# Patient Record
Sex: FEMALE | Race: WHITE | NOT HISPANIC OR LATINO | Employment: UNEMPLOYED | URBAN - METROPOLITAN AREA
[De-identification: names, ages, dates, MRNs, and addresses within clinical notes are randomized per-mention and may not be internally consistent; named-entity substitution may affect disease eponyms.]

---

## 2019-09-10 PROCEDURE — 93005 ELECTROCARDIOGRAM TRACING: CPT

## 2019-09-10 PROCEDURE — 99283 EMERGENCY DEPT VISIT LOW MDM: CPT | Performed by: EMERGENCY MEDICINE

## 2019-09-10 RX ORDER — HYDROCHLOROTHIAZIDE 25 MG/1
25 TABLET ORAL DAILY
COMMUNITY

## 2019-09-10 RX ORDER — SERTRALINE HYDROCHLORIDE 50 MG/1
50 TABLET, FILM COATED ORAL DAILY
COMMUNITY

## 2019-09-10 RX ORDER — AMLODIPINE BESYLATE 5 MG/1
5 TABLET ORAL DAILY
COMMUNITY

## 2019-09-10 RX ORDER — DICYCLOMINE HYDROCHLORIDE 10 MG/1
10 CAPSULE ORAL
COMMUNITY

## 2019-09-10 RX ORDER — EZETIMIBE 10 MG/1
10 TABLET ORAL DAILY
COMMUNITY

## 2019-09-10 RX ORDER — FAMOTIDINE 20 MG/1
40 TABLET, FILM COATED ORAL 2 TIMES DAILY
COMMUNITY

## 2019-09-10 ASSESSMENT — PAIN DESCRIPTION - DESCRIPTORS: DESCRIPTORS: PRESSURE

## 2019-09-11 ENCOUNTER — APPOINTMENT (OUTPATIENT)
Dept: RADIOLOGY | Facility: HOSPITAL | Age: 69
End: 2019-09-11
Payer: MEDICARE

## 2019-09-11 ENCOUNTER — HOSPITAL ENCOUNTER (EMERGENCY)
Facility: HOSPITAL | Age: 69
Discharge: HOME | End: 2019-09-11
Attending: EMERGENCY MEDICINE
Payer: MEDICARE

## 2019-09-11 ENCOUNTER — APPOINTMENT (OUTPATIENT)
Dept: CT IMAGING | Facility: HOSPITAL | Age: 69
End: 2019-09-11
Payer: MEDICARE

## 2019-09-11 VITALS
SYSTOLIC BLOOD PRESSURE: 157 MMHG | DIASTOLIC BLOOD PRESSURE: 77 MMHG | TEMPERATURE: 97.7 F | WEIGHT: 181.66 LBS | RESPIRATION RATE: 14 BRPM | OXYGEN SATURATION: 97 % | HEART RATE: 72 BPM

## 2019-09-11 DIAGNOSIS — R79.9 ELEVATED BUN: ICD-10-CM

## 2019-09-11 DIAGNOSIS — K80.20 CHOLELITHIASIS: ICD-10-CM

## 2019-09-11 DIAGNOSIS — R73.9 HYPERGLYCEMIA: ICD-10-CM

## 2019-09-11 DIAGNOSIS — R10.13 EPIGASTRIC PAIN: Primary | ICD-10-CM

## 2019-09-11 DIAGNOSIS — E66.9 OBESITY: ICD-10-CM

## 2019-09-11 DIAGNOSIS — E87.6 HYPOKALEMIA: ICD-10-CM

## 2019-09-11 LAB
ALBUMIN SERPL-MCNC: 4.2 G/DL (ref 3.5–5.3)
ALP SERPL-CCNC: 33 U/L (ref 55–142)
ALT SERPL-CCNC: 29 U/L (ref 0–52)
ANION GAP SERPL CALC-SCNC: 10 MMOL/L (ref 3–11)
AST SERPL-CCNC: 24 U/L (ref 0–39)
BASOPHILS # BLD AUTO: 0 10*3/UL
BASOPHILS NFR BLD AUTO: 1 % (ref 0–2)
BILIRUB SERPL-MCNC: 0.37 MG/DL (ref 0–1.4)
BNP SERPL-MCNC: 83 PG/ML (ref 0–100)
BUN SERPL-MCNC: 27 MG/DL (ref 7–25)
CALCIUM ALBUM COR SERPL-MCNC: 9.8 MG/DL (ref 8.6–10.3)
CALCIUM SERPL-MCNC: 10 MG/DL (ref 8.6–10.3)
CHLORIDE SERPL-SCNC: 104 MMOL/L (ref 98–107)
CO2 SERPL-SCNC: 26 MMOL/L (ref 21–32)
CREAT SERPL-MCNC: 0.89 MG/DL (ref 0.6–1.2)
EOSINOPHIL # BLD AUTO: 0.1 10*3/UL
EOSINOPHIL NFR BLD AUTO: 1 % (ref 0–3)
ERYTHROCYTE [DISTWIDTH] IN BLOOD BY AUTOMATED COUNT: 15.5 % (ref 11.5–14)
GFR SERPL CREATININE-BSD FRML MDRD: 65 ML/MIN/1.73M*2
GLUCOSE SERPL-MCNC: 146 MG/DL (ref 70–105)
HCT VFR BLD AUTO: 39.7 % (ref 34–45)
HGB BLD-MCNC: 12.9 G/DL (ref 11.5–15.5)
LIPASE SERPL-CCNC: 44 U/L (ref 11–82)
LYMPHOCYTES # BLD AUTO: 1.8 10*3/UL
LYMPHOCYTES NFR BLD AUTO: 23 % (ref 11–47)
MAGNESIUM SERPL-MCNC: 2 MG/DL (ref 1.8–2.4)
MCH RBC QN AUTO: 27.6 PG (ref 28–33)
MCHC RBC AUTO-ENTMCNC: 32.6 G/DL (ref 32–36)
MCV RBC AUTO: 84.6 FL (ref 81–97)
MONOCYTES # BLD AUTO: 0.2 10*3/UL
MONOCYTES NFR BLD AUTO: 3 % (ref 3–11)
NEUTROPHILS # BLD AUTO: 5.6 10*3/UL
NEUTROPHILS NFR BLD AUTO: 72 % (ref 41–81)
PLATELET # BLD AUTO: 224 10*3/UL (ref 140–350)
PMV BLD AUTO: 8.4 FL (ref 6.9–10.8)
POTASSIUM SERPL-SCNC: 3.4 MMOL/L (ref 3.5–5.1)
PROT SERPL-MCNC: 6.8 G/DL (ref 6–8.3)
RBC # BLD AUTO: 4.69 10*6/ΜL (ref 3.7–5.3)
SODIUM SERPL-SCNC: 140 MMOL/L (ref 135–145)
TROPONIN I SERPL-MCNC: <0.03 NG/ML
TSH SERPL DL<=0.05 MIU/L-ACNC: 4.33 UIU/ML (ref 0.34–4.82)
WBC # BLD AUTO: 7.8 10*3/UL (ref 4.5–10.5)

## 2019-09-11 PROCEDURE — 6360000200 HC RX 636 W HCPCS (ALT 250 FOR IP): Performed by: EMERGENCY MEDICINE

## 2019-09-11 PROCEDURE — 71046 X-RAY EXAM CHEST 2 VIEWS: CPT

## 2019-09-11 PROCEDURE — 2550000100 HC RX 255: Performed by: EMERGENCY MEDICINE

## 2019-09-11 PROCEDURE — 84443 ASSAY THYROID STIM HORMONE: CPT | Performed by: EMERGENCY MEDICINE

## 2019-09-11 PROCEDURE — 2590000100 HC RX 259: Performed by: EMERGENCY MEDICINE

## 2019-09-11 PROCEDURE — 74177 CT ABD & PELVIS W/CONTRAST: CPT

## 2019-09-11 PROCEDURE — 2500000200 HC RX 250 WO HCPCS: Performed by: EMERGENCY MEDICINE

## 2019-09-11 PROCEDURE — 36415 COLL VENOUS BLD VENIPUNCTURE: CPT | Performed by: EMERGENCY MEDICINE

## 2019-09-11 PROCEDURE — 85025 COMPLETE CBC W/AUTO DIFF WBC: CPT | Performed by: EMERGENCY MEDICINE

## 2019-09-11 PROCEDURE — 83735 ASSAY OF MAGNESIUM: CPT | Performed by: EMERGENCY MEDICINE

## 2019-09-11 PROCEDURE — 83880 ASSAY OF NATRIURETIC PEPTIDE: CPT | Performed by: EMERGENCY MEDICINE

## 2019-09-11 PROCEDURE — 96375 TX/PRO/DX INJ NEW DRUG ADDON: CPT

## 2019-09-11 PROCEDURE — 96361 HYDRATE IV INFUSION ADD-ON: CPT

## 2019-09-11 PROCEDURE — 2580000300 HC RX 258: Performed by: EMERGENCY MEDICINE

## 2019-09-11 PROCEDURE — 83690 ASSAY OF LIPASE: CPT | Performed by: EMERGENCY MEDICINE

## 2019-09-11 PROCEDURE — 80053 COMPREHEN METABOLIC PANEL: CPT | Performed by: EMERGENCY MEDICINE

## 2019-09-11 PROCEDURE — 96374 THER/PROPH/DIAG INJ IV PUSH: CPT

## 2019-09-11 PROCEDURE — 84484 ASSAY OF TROPONIN QUANT: CPT | Performed by: EMERGENCY MEDICINE

## 2019-09-11 RX ORDER — IOPAMIDOL 755 MG/ML
115 INJECTION, SOLUTION INTRAVASCULAR ONCE
Status: COMPLETED | OUTPATIENT
Start: 2019-09-11 | End: 2019-09-11

## 2019-09-11 RX ORDER — HYDROCODONE BITARTRATE AND ACETAMINOPHEN 5; 325 MG/1; MG/1
1 TABLET ORAL ONCE
Status: DISCONTINUED | OUTPATIENT
Start: 2019-09-11 | End: 2019-09-11 | Stop reason: HOSPADM

## 2019-09-11 RX ORDER — ONDANSETRON HCL 4 MG
1 TABLET ORAL ONCE
Status: DISCONTINUED | OUTPATIENT
Start: 2019-09-11 | End: 2019-09-11 | Stop reason: HOSPADM

## 2019-09-11 RX ORDER — SODIUM CHLORIDE 9 MG/ML
1000 INJECTION, SOLUTION INTRAVENOUS ONCE
Status: COMPLETED | OUTPATIENT
Start: 2019-09-11 | End: 2019-09-11

## 2019-09-11 RX ORDER — MORPHINE SULFATE 4 MG/ML
4 INJECTION, SOLUTION INTRAMUSCULAR; INTRAVENOUS ONCE
Status: COMPLETED | OUTPATIENT
Start: 2019-09-11 | End: 2019-09-11

## 2019-09-11 RX ORDER — ONDANSETRON HYDROCHLORIDE 2 MG/ML
4 INJECTION, SOLUTION INTRAVENOUS ONCE
Status: COMPLETED | OUTPATIENT
Start: 2019-09-11 | End: 2019-09-11

## 2019-09-11 RX ADMIN — MORPHINE SULFATE 4 MG: 4 INJECTION, SOLUTION INTRAMUSCULAR; INTRAVENOUS at 00:25

## 2019-09-11 RX ADMIN — SODIUM CHLORIDE 1000 ML: 9 INJECTION, SOLUTION INTRAVENOUS at 00:32

## 2019-09-11 RX ADMIN — ONDANSETRON 4 MG: 2 INJECTION INTRAMUSCULAR; INTRAVENOUS at 00:26

## 2019-09-11 RX ADMIN — LIDOCAINE HYDROCHLORIDE 45 ML: 20 SOLUTION ORAL; TOPICAL at 00:26

## 2019-09-11 RX ADMIN — IOPAMIDOL 115 ML: 755 INJECTION, SOLUTION INTRAVENOUS at 01:40

## 2019-09-11 NOTE — ED PROVIDER NOTES
"Abdominal Pain (pt states hx of acid reflux and states increased pain. pt states \"this is worse than I've ever had\")        HPI:  This is a 69-year-old female reporting to the emergency department with complaints of epigastric pain.    Patient states that her pain started several hours ago.  She describes it to be a crampy, burning type discomfort in her epigastrium.  No radiation of her pain.  She is unaware of anything that worsens or improves her pain.  She has moderate intensity.  Patient does report that she has had this discomfort numerous times in the past.    Patient is on a bus tour and she reports that she forgot her Bentyl at home.  The pain is worse than it normally is and she now presents for further evaluation.  She denies any chest pain, pressure, or heaviness.  No shortness of breath.  No weakness in her extremities.  She denies any lower abdominal pain.  No fevers or chills.  No vomiting.  No diarrhea.          Past Medical History:   Diagnosis Date   • GERD (gastroesophageal reflux disease)        History reviewed. No pertinent surgical history.    Social History     Socioeconomic History   • Marital status:      Spouse name: Not on file   • Number of children: Not on file   • Years of education: Not on file   • Highest education level: Not on file   Occupational History   • Not on file   Social Needs   • Financial resource strain: Not on file   • Food insecurity:     Worry: Not on file     Inability: Not on file   • Transportation needs:     Medical: Not on file     Non-medical: Not on file   Tobacco Use   • Smoking status: Not on file   Substance and Sexual Activity   • Alcohol use: Not on file   • Drug use: Not on file   • Sexual activity: Not on file   Lifestyle   • Physical activity:     Days per week: Not on file     Minutes per session: Not on file   • Stress: Not on file   Relationships   • Social connections:     Talks on phone: Not on file     Gets together: Not on file     Attends " Latter day service: Not on file     Active member of club or organization: Not on file     Attends meetings of clubs or organizations: Not on file     Relationship status: Not on file   • Intimate partner violence:     Fear of current or ex partner: Not on file     Emotionally abused: Not on file     Physically abused: Not on file     Forced sexual activity: Not on file   Other Topics Concern   • Not on file   Social History Narrative   • Not on file       History reviewed. No pertinent family history.    No Known Allergies      Current Outpatient Medications:   •  amLODIPine (NORVASC) 5 mg tablet, Take 5 mg by mouth daily, Disp: , Rfl:   •  sertraline (ZOLOFT) 50 mg tablet, Take 50 mg by mouth daily, Disp: , Rfl:   •  ezetimibe (Zetia) 10 mg tablet, Take 10 mg by mouth daily, Disp: , Rfl:   •  FENOFIBRATE ORAL, Take 60 mg by mouth, Disp: , Rfl:   •  famotidine (PEPCID) 20 mg tablet, Take 40 mg by mouth 2 (two) times a day, Disp: , Rfl:   •  hydroCHLOROthiazide (HYDRODIURIL) 25 mg tablet, Take 25 mg by mouth daily, Disp: , Rfl:   •  dicyclomine (BENTYL) 10 mg capsule, Take 10 mg by mouth 4 (four) times a day (before meals and nightly), Disp: , Rfl:       ROS:  Constitutional: negative for fever  Eyes: negative for visual changes  ENT: negative for sore throat  Cardiovascular: negative for chest pain  Respiratory: negative for shortness of breath   GI: negative for abdominal pain  : negative for hematuria  Musculoskeletal: negative for back pain  Neuro: negative for headache  Hematology: negative for bleeding  Immunology: negative for joint pain      ED Triage Vitals   Temp Heart Rate Resp BP SpO2   09/10/19 2116 09/10/19 2116 09/10/19 2116 09/10/19 2116 09/10/19 2116   36.5 °C (97.7 °F) 66 17 (!) 181/83 97 %      Temp Source Heart Rate Source Patient Position BP Location FiO2 (%)   09/10/19 2116 -- 09/11/19 0104 -- --   Oral  Head of bed 30 degrees or higher           Physical Exam:  Nursing note and vitals  reviewed.  Constitutional: Nontoxic-appearing elderly female.  She answers questions appropriately.  Head: Normocephalic and atraumatic. Mucous membranes are moist.   Eyes: Pupils are equal, round, and reactive to light.   Neck: Supple.  Cardiovascular: Regular rate and rhythm without murmur.   Pulmonary/Chest: No respiratory distress.  Clear to auscultation bilaterally.  Abdominal: Soft and nontender.    She locates her discomfort in the epigastrium however she is not tender.  Back: No CVA tenderness. No midline tenderness.   Musculoskeletal: No edema  Neurological: Alert.   Skin: Skin is warm and dry. No rash noted.   Psychiatric: Normal mood and affect.        Labs:  Labs Reviewed   CBC WITH AUTO DIFFERENTIAL - Abnormal       Result Value    WBC 7.8      RBC 4.69      Hemoglobin 12.9      Hematocrit 39.7      MCV 84.6      MCH 27.6 (*)     MCHC 32.6      RDW 15.5 (*)     Platelets 224      MPV 8.4      Neutrophils% 72      Lymphocytes% 23      Monocytes% 3      Eosinophils% 1      Basophils% 1      Neutrophils Absolute 5.60      Lymphocytes Absolute 1.80      Monocytes Absolute 0.20      Eosinophils Absolute 0.10      Basophils Absolute 0.00     COMPREHENSIVE METABOLIC PANEL - Abnormal    Sodium 140      Potassium 3.4 (*)     Chloride 104      CO2 26      Anion Gap 10      BUN 27 (*)     Creatinine 0.89      Glucose 146 (*)     Calcium 10.0      AST 24      ALT (SGPT) 29      Alkaline Phosphatase 33 (*)     Total Protein 6.8      Albumin 4.2      Total Bilirubin 0.37      eGFR 65      Corrected Calcium 9.8      Narrative:     Estimated GFR calculated using the 2009 CKD-EPI creatinine equation.   TROPONIN I - Normal    Troponin I <0.030     MAGNESIUM - Normal    Magnesium 2.0     TSH - Normal    TSH 4.335     B-TYPE NATRIURETIC PEPTIDE (BNP) - Normal    BNP 83     LIPASE - Normal    Lipase 44         Imaging:  CT ABDOMEN PELVIS W IV CONTRAST NO ORAL CONTRAST   Preliminary Result   Impression:      Distended  gallbladder with possible stone in the cystic duct.   Follow-up gallbladder studies may be of value.      Nonspecific left renal hypodensities, question cyst versus solid.   Recommend follow-up nonemergent ultrasound.      X-ray chest 2 views    (Results Pending)       MDM:   This is a 69-year-old female presenting to the emergency department with complaints of epigastric pain.  Patient appears nontoxic but uncomfortable.  She will be given a liter normal saline along with morphine and Zofran.  She will also be given a GI cocktail.  Baseline labs and a troponin will be ordered.  EKG will be obtained and patient will be observed.    She remained stable.  She stated that she was not significantly improved and ultimately a CT of the abdomen pelvis was obtained.  This showed a distended gallbladder with a possible stone in the cystic duct.  Admission to the hospital was recommended but the patient does not want to stay.  Long conversation was had regarding to possible complications if this was not further worked up.  Patient and her  are willing to accept these risks.  He will be discharged home.  The importance of returning to the emergency department if symptoms worsen was stressed.  He will be discharged home.    Given   Medications   ondansetron (ZOFRAN) injection 4 mg (4 mg intravenous Given 9/11/19 0026)   morphine injection 4 mg (4 mg intravenous Given 9/11/19 0025)   sodium chloride 0.9 % bolus 1,000 mL (1,000 mL intravenous New Bag/New Syringe 9/11/19 0032)   antacid/lidocaine 2% viscous (GI COCKTAIL KIT) 45 mL suspension (45 mL oral Given 9/11/19 0026)   iopamidol (ISOVUE-370) 76 % injection 115 mL (115 mL intravenous Given 9/11/19 0140)         Procedure    Procedures        Clinical Impression:    Final diagnoses:   [R10.13] Epigastric pain   [K80.20] Cholelithiasis   [E87.6] Hypokalemia   [R79.9] Elevated BUN   [R73.9] Hyperglycemia   [E66.9] Obesity         I, Koby Loaiza MD, personally performed the  services described in this documentation as transcribed by an emergency department scribe, in my presence, and it is both accurate and complete.      Koby Loaiza MD  09/11/19 2501

## 2019-09-11 NOTE — LETTER
September 13, 2019    Karyn Phelan  [address] [place] [id number]      To whom it may concern:      Mrs. Karyn Phelan was seen in the emergency department by myself for abdominal pain.  She had a significant health hazard and was no longer able to travel on her tour.  She was required to emergently terminate her trip and travel home for this health condition.    If you have any questions or concerns, please don't hesitate to call.    Sincerely,    Koby Loaiza Jr., MD         No name on file.        CC: No Recipients

## 2019-09-11 NOTE — DISCHARGE INSTRUCTIONS
Return to this emergency department if any symptoms worsen.  Follow-up with your primary care physician upon return home.

## 2022-02-21 ENCOUNTER — DOCTOR'S OFFICE (OUTPATIENT)
Dept: URBAN - METROPOLITAN AREA CLINIC 162 | Facility: CLINIC | Age: 72
Setting detail: OPHTHALMOLOGY
End: 2022-02-21
Payer: MEDICARE

## 2022-02-21 PROCEDURE — 92250 FUNDUS PHOTOGRAPHY W/I&R: CPT | Performed by: OPHTHALMOLOGY

## 2022-02-21 PROCEDURE — 92014 COMPRE OPH EXAM EST PT 1/>: CPT | Performed by: OPHTHALMOLOGY

## 2022-02-21 PROCEDURE — 92015 DETERMINE REFRACTIVE STATE: CPT | Performed by: OPHTHALMOLOGY

## 2022-02-21 ASSESSMENT — REFRACTION_CURRENTRX
OD_AXIS: 023
OD_VPRISM_DIRECTION: PROGS
OD_SPHERE: -2.50
OD_ADD: +2.00
OD_OVR_VA: 20/
OS_VPRISM_DIRECTION: PROGS
OS_CYLINDER: -1.00
OD_CYLINDER: -0.50
OS_SPHERE: -0.75
OS_AXIS: 118
OS_ADD: +2.00
OS_OVR_VA: 20/

## 2022-02-21 ASSESSMENT — PACHYMETRY
OD_CT_CORRECTION: 4
OS_CT_CORRECTION: 3
OS_CT_UM: 506
OD_CT_UM: 496

## 2022-02-21 ASSESSMENT — TEAR BREAK UP TIME (TBUT)
OD_TBUT: 5 SEC
OS_TBUT: 7 SEC

## 2022-02-21 ASSESSMENT — REFRACTION_MANIFEST
OS_ADD: +2.50
OS_CYLINDER: -1.25
OS_VA1: 20/30
OD_CYLINDER: SPH
OS_SPHERE: -0.75
OS_AXIS: 110
OD_VA1: 20/25+
OD_ADD: +2.50
OD_SPHERE: -2.25

## 2022-02-21 ASSESSMENT — SPHEQUIV_DERIVED
OS_SPHEQUIV: -1.25
OS_SPHEQUIV: -1.375
OD_SPHEQUIV: -2.375

## 2022-02-21 ASSESSMENT — KERATOMETRY
OS_K1POWER_DIOPTERS: 44.50
OD_K1POWER_DIOPTERS: 45.25
OD_K2POWER_DIOPTERS: 45.50
OS_AXISANGLE_DEGREES: 42
OD_AXISANGLE_DEGREES: 114
OS_K2POWER_DIOPTERS: 45.00

## 2022-02-21 ASSESSMENT — AXIALLENGTH_DERIVED
OS_AL: 23.6687
OS_AL: 23.6198
OD_AL: 23.8309

## 2022-02-21 ASSESSMENT — REFRACTION_AUTOREFRACTION
OD_AXIS: 31
OS_SPHERE: -0.50
OD_SPHERE: -2.25
OS_CYLINDER: -1.50
OS_AXIS: 117
OD_CYLINDER: -0.25

## 2022-02-21 ASSESSMENT — CONFRONTATIONAL VISUAL FIELD TEST (CVF)
OD_FINDINGS: FULL
OS_FINDINGS: FULL

## 2022-02-21 ASSESSMENT — VISUAL ACUITY
OD_BCVA: 20/40
OS_BCVA: 20/30

## 2022-03-09 ENCOUNTER — DOCTOR'S OFFICE (OUTPATIENT)
Dept: URBAN - METROPOLITAN AREA CLINIC 162 | Facility: CLINIC | Age: 72
Setting detail: OPHTHALMOLOGY
End: 2022-03-09
Payer: MEDICARE

## 2022-03-09 PROCEDURE — 92083 EXTENDED VISUAL FIELD XM: CPT | Performed by: OPHTHALMOLOGY

## 2022-08-22 ENCOUNTER — DOCTOR'S OFFICE (OUTPATIENT)
Dept: URBAN - METROPOLITAN AREA CLINIC 162 | Facility: CLINIC | Age: 72
Setting detail: OPHTHALMOLOGY
End: 2022-08-22
Payer: MEDICARE

## 2022-08-22 ENCOUNTER — RX ONLY (RX ONLY)
Age: 72
End: 2022-08-22

## 2022-08-22 PROBLEM — H25.13: Status: ACTIVE | Noted: 2017-02-01

## 2022-08-22 PROBLEM — H04.123 DES; BOTH EYES: Status: ACTIVE | Noted: 2020-11-13

## 2022-08-22 PROBLEM — H15.102: Status: ACTIVE | Noted: 2017-02-01

## 2022-08-22 PROBLEM — H40.013 POAG SUSP; BOTH EYES ;
LOW SUSPICION: Status: ACTIVE | Noted: 2017-02-01

## 2022-08-22 PROBLEM — H44.322: Status: ACTIVE | Noted: 2017-02-01

## 2022-08-22 PROCEDURE — 92020 GONIOSCOPY: CPT | Performed by: OPHTHALMOLOGY

## 2022-08-22 PROCEDURE — 92133 CPTRZD OPH DX IMG PST SGM ON: CPT | Performed by: OPHTHALMOLOGY

## 2022-08-22 PROCEDURE — 92012 INTRM OPH EXAM EST PATIENT: CPT | Performed by: OPHTHALMOLOGY

## 2022-08-22 ASSESSMENT — KERATOMETRY
OS_AXISANGLE_DEGREES: 42
OD_K1POWER_DIOPTERS: 45.25
OS_K1POWER_DIOPTERS: 44.50
OS_K2POWER_DIOPTERS: 45.00
OD_AXISANGLE_DEGREES: 114
OD_K2POWER_DIOPTERS: 45.50

## 2022-08-22 ASSESSMENT — REFRACTION_CURRENTRX
OD_AXIS: 023
OS_VPRISM_DIRECTION: PROGS
OD_OVR_VA: 20/
OS_ADD: +2.00
OD_CYLINDER: -0.50
OD_SPHERE: -2.50
OS_CYLINDER: -1.00
OD_ADD: +2.00
OS_SPHERE: -0.75
OD_VPRISM_DIRECTION: PROGS
OS_AXIS: 118
OS_OVR_VA: 20/

## 2022-08-22 ASSESSMENT — REFRACTION_MANIFEST
OD_CYLINDER: SPH
OS_SPHERE: -0.75
OS_CYLINDER: -1.25
OD_VA1: 20/25+
OS_VA1: 20/30
OS_AXIS: 110
OD_SPHERE: -2.25
OD_ADD: +2.50
OS_ADD: +2.50

## 2022-08-22 ASSESSMENT — REFRACTION_AUTOREFRACTION
OD_CYLINDER: -0.25
OD_AXIS: 31
OS_SPHERE: -0.50
OD_SPHERE: -2.25
OS_AXIS: 117
OS_CYLINDER: -1.50

## 2022-08-22 ASSESSMENT — AXIALLENGTH_DERIVED
OS_AL: 23.6687
OD_AL: 23.8309
OS_AL: 23.6198

## 2022-08-22 ASSESSMENT — VISUAL ACUITY
OD_BCVA: 20/40
OS_BCVA: 20/30

## 2022-08-22 ASSESSMENT — SPHEQUIV_DERIVED
OS_SPHEQUIV: -1.25
OS_SPHEQUIV: -1.375
OD_SPHEQUIV: -2.375

## 2022-08-22 ASSESSMENT — TEAR BREAK UP TIME (TBUT)
OS_TBUT: 7 SEC
OD_TBUT: 5 SEC

## 2023-03-06 ENCOUNTER — DOCTOR'S OFFICE (OUTPATIENT)
Dept: URBAN - METROPOLITAN AREA CLINIC 162 | Facility: CLINIC | Age: 73
Setting detail: OPHTHALMOLOGY
End: 2023-03-06
Payer: MEDICARE

## 2023-03-06 DIAGNOSIS — H04.123: ICD-10-CM

## 2023-03-06 DIAGNOSIS — H25.13: ICD-10-CM

## 2023-03-06 DIAGNOSIS — H40.013: ICD-10-CM

## 2023-03-06 PROCEDURE — 92250 FUNDUS PHOTOGRAPHY W/I&R: CPT | Performed by: OPHTHALMOLOGY

## 2023-03-06 PROCEDURE — 92014 COMPRE OPH EXAM EST PT 1/>: CPT | Performed by: OPHTHALMOLOGY

## 2023-03-06 ASSESSMENT — REFRACTION_MANIFEST
OD_VA1: 20/25+
OS_CYLINDER: -1.25
OD_SPHERE: -2.25
OD_ADD: +2.50
OS_ADD: +2.50
OS_SPHERE: -0.75
OS_AXIS: 110
OS_VA1: 20/30
OD_CYLINDER: SPH

## 2023-03-06 ASSESSMENT — CONFRONTATIONAL VISUAL FIELD TEST (CVF)
OS_FINDINGS: FULL
OD_FINDINGS: FULL

## 2023-03-06 ASSESSMENT — KERATOMETRY
OS_AXISANGLE_DEGREES: 041
OD_K2POWER_DIOPTERS: 5.75
OS_K1POWER_DIOPTERS: 44.25
OD_AXISANGLE_DEGREES: 111
OD_K1POWER_DIOPTERS: 45.00
OS_K2POWER_DIOPTERS: 45.00

## 2023-03-06 ASSESSMENT — AXIALLENGTH_DERIVED
OS_AL: 23.715
OD_AL: 34.66
OS_AL: 23.715

## 2023-03-06 ASSESSMENT — REFRACTION_AUTOREFRACTION
OS_CYLINDER: -1.25
OD_SPHERE: -2.00
OD_CYLINDER: -0.50
OS_SPHERE: -0.75
OD_AXIS: 057
OS_AXIS: 111

## 2023-03-06 ASSESSMENT — REFRACTION_CURRENTRX
OS_ADD: +2.00
OS_CYLINDER: -1.00
OD_SPHERE: -2.50
OD_OVR_VA: 20/
OD_VPRISM_DIRECTION: PROGS
OS_OVR_VA: 20/
OS_AXIS: 118
OS_SPHERE: -0.75
OD_ADD: +2.00
OD_AXIS: 023
OS_VPRISM_DIRECTION: PROGS
OD_CYLINDER: -0.50

## 2023-03-06 ASSESSMENT — VISUAL ACUITY
OD_BCVA: 20/30
OS_BCVA: 20/30

## 2023-03-06 ASSESSMENT — SPHEQUIV_DERIVED
OD_SPHEQUIV: -2.25
OS_SPHEQUIV: -1.375
OS_SPHEQUIV: -1.375

## 2023-03-06 ASSESSMENT — TEAR BREAK UP TIME (TBUT)
OD_TBUT: 5 SEC
OS_TBUT: 7 SEC

## 2023-07-11 ENCOUNTER — DOCTOR'S OFFICE (OUTPATIENT)
Dept: URBAN - METROPOLITAN AREA CLINIC 162 | Facility: CLINIC | Age: 73
Setting detail: OPHTHALMOLOGY
End: 2023-07-11
Payer: MEDICARE

## 2023-07-11 DIAGNOSIS — H40.013: ICD-10-CM

## 2023-07-11 PROCEDURE — 92012 INTRM OPH EXAM EST PATIENT: CPT | Performed by: OPHTHALMOLOGY

## 2023-07-11 PROCEDURE — 92083 EXTENDED VISUAL FIELD XM: CPT | Performed by: OPHTHALMOLOGY

## 2023-07-11 ASSESSMENT — REFRACTION_CURRENTRX
OD_AXIS: 023
OD_SPHERE: -2.50
OD_VPRISM_DIRECTION: PROGS
OS_CYLINDER: -1.00
OS_AXIS: 118
OD_ADD: +2.00
OS_OVR_VA: 20/
OD_OVR_VA: 20/
OS_ADD: +2.00
OD_CYLINDER: -0.50
OS_SPHERE: -0.75
OS_VPRISM_DIRECTION: PROGS

## 2023-07-11 ASSESSMENT — VISUAL ACUITY
OD_BCVA: 20/40
OS_BCVA: 20/30

## 2023-07-11 ASSESSMENT — SPHEQUIV_DERIVED
OD_SPHEQUIV: -2.25
OS_SPHEQUIV: -1.375
OS_SPHEQUIV: -1.375

## 2023-07-11 ASSESSMENT — AXIALLENGTH_DERIVED
OS_AL: 23.715
OD_AL: 34.66
OS_AL: 23.715

## 2023-07-11 ASSESSMENT — KERATOMETRY
OD_AXISANGLE_DEGREES: 111
OD_K2POWER_DIOPTERS: 5.75
OS_AXISANGLE_DEGREES: 041
OS_K1POWER_DIOPTERS: 44.25
OD_K1POWER_DIOPTERS: 45.00
OS_K2POWER_DIOPTERS: 45.00

## 2023-07-11 ASSESSMENT — REFRACTION_MANIFEST
OD_VA1: 20/25+
OS_SPHERE: -0.75
OS_CYLINDER: -1.25
OS_ADD: +2.50
OD_CYLINDER: SPH
OS_VA1: 20/30
OD_SPHERE: -2.25
OS_AXIS: 110
OD_ADD: +2.50

## 2023-07-11 ASSESSMENT — REFRACTION_AUTOREFRACTION
OD_SPHERE: -2.00
OD_CYLINDER: -0.50
OS_CYLINDER: -1.25
OD_AXIS: 057
OS_SPHERE: -0.75
OS_AXIS: 111

## 2023-07-11 ASSESSMENT — TEAR BREAK UP TIME (TBUT)
OD_TBUT: 5 SEC
OS_TBUT: 7 SEC

## 2023-11-27 ENCOUNTER — DOCTOR'S OFFICE (OUTPATIENT)
Dept: URBAN - METROPOLITAN AREA CLINIC 162 | Facility: CLINIC | Age: 73
Setting detail: OPHTHALMOLOGY
End: 2023-11-27
Payer: MEDICARE

## 2023-11-27 DIAGNOSIS — H25.13: ICD-10-CM

## 2023-11-27 DIAGNOSIS — H40.013: ICD-10-CM

## 2023-11-27 DIAGNOSIS — H04.123: ICD-10-CM

## 2023-11-27 PROCEDURE — 92133 CPTRZD OPH DX IMG PST SGM ON: CPT | Performed by: OPHTHALMOLOGY

## 2023-11-27 PROCEDURE — 92020 GONIOSCOPY: CPT | Performed by: OPHTHALMOLOGY

## 2023-11-27 PROCEDURE — 92012 INTRM OPH EXAM EST PATIENT: CPT | Performed by: OPHTHALMOLOGY

## 2023-11-27 ASSESSMENT — TEAR BREAK UP TIME (TBUT)
OS_TBUT: 7 SEC
OD_TBUT: 5 SEC

## 2023-11-27 ASSESSMENT — REFRACTION_CURRENTRX
OD_VPRISM_DIRECTION: PROGS
OD_CYLINDER: -0.50
OD_OVR_VA: 20/
OS_CYLINDER: -1.00
OD_AXIS: 023
OD_SPHERE: -2.50
OD_ADD: +2.00
OS_OVR_VA: 20/
OS_AXIS: 118
OS_ADD: +2.00
OS_SPHERE: -0.75
OS_VPRISM_DIRECTION: PROGS

## 2023-11-27 ASSESSMENT — CONFRONTATIONAL VISUAL FIELD TEST (CVF)
OD_FINDINGS: FULL
OS_FINDINGS: FULL

## 2023-11-27 ASSESSMENT — REFRACTION_AUTOREFRACTION
OS_CYLINDER: -1.25
OS_SPHERE: -0.75
OS_AXIS: 111
OD_CYLINDER: -0.50
OD_AXIS: 057
OD_SPHERE: -2.00

## 2023-11-27 ASSESSMENT — REFRACTION_MANIFEST
OD_VA1: 20/25+
OD_SPHERE: -2.25
OS_SPHERE: -0.75
OD_ADD: +2.50
OD_CYLINDER: SPH
OS_VA1: 20/30
OS_AXIS: 110
OS_ADD: +2.50
OS_CYLINDER: -1.25

## 2023-11-27 ASSESSMENT — SPHEQUIV_DERIVED
OS_SPHEQUIV: -1.375
OS_SPHEQUIV: -1.375
OD_SPHEQUIV: -2.25

## 2024-04-02 ENCOUNTER — DOCTOR'S OFFICE (OUTPATIENT)
Dept: URBAN - METROPOLITAN AREA CLINIC 162 | Facility: CLINIC | Age: 74
Setting detail: OPHTHALMOLOGY
End: 2024-04-02
Payer: MEDICARE

## 2024-04-02 DIAGNOSIS — H04.123: ICD-10-CM

## 2024-04-02 DIAGNOSIS — H25.13: ICD-10-CM

## 2024-04-02 DIAGNOSIS — H40.013: ICD-10-CM

## 2024-04-02 DIAGNOSIS — H52.4: ICD-10-CM

## 2024-04-02 DIAGNOSIS — H25.013: ICD-10-CM

## 2024-04-02 PROCEDURE — 92015 DETERMINE REFRACTIVE STATE: CPT | Performed by: OPHTHALMOLOGY

## 2024-04-02 PROCEDURE — 92250 FUNDUS PHOTOGRAPHY W/I&R: CPT | Performed by: OPHTHALMOLOGY

## 2024-04-02 PROCEDURE — 92014 COMPRE OPH EXAM EST PT 1/>: CPT | Performed by: OPHTHALMOLOGY

## 2024-04-04 ENCOUNTER — DOCTOR'S OFFICE (OUTPATIENT)
Dept: URBAN - METROPOLITAN AREA CLINIC 162 | Facility: CLINIC | Age: 74
Setting detail: OPHTHALMOLOGY
End: 2024-04-04
Payer: MEDICARE

## 2024-04-04 DIAGNOSIS — H10.89: ICD-10-CM

## 2024-04-04 DIAGNOSIS — H10.521: ICD-10-CM

## 2024-04-04 PROCEDURE — 92012 INTRM OPH EXAM EST PATIENT: CPT | Performed by: OPHTHALMOLOGY

## 2024-04-04 ASSESSMENT — LID EXAM ASSESSMENTS: OD_ANGULAR_BLEPHARITIS: RUL 2+

## 2024-04-29 ENCOUNTER — DOCTOR'S OFFICE (OUTPATIENT)
Dept: URBAN - METROPOLITAN AREA CLINIC 162 | Facility: CLINIC | Age: 74
Setting detail: OPHTHALMOLOGY
End: 2024-04-29
Payer: MEDICARE

## 2024-04-29 ENCOUNTER — RX ONLY (RX ONLY)
Age: 74
End: 2024-04-29

## 2024-04-29 DIAGNOSIS — H02.32: ICD-10-CM

## 2024-04-29 DIAGNOSIS — H04.123: ICD-10-CM

## 2024-04-29 DIAGNOSIS — H10.45: ICD-10-CM

## 2024-04-29 DIAGNOSIS — H10.89: ICD-10-CM

## 2024-04-29 DIAGNOSIS — H02.35: ICD-10-CM

## 2024-04-29 DIAGNOSIS — H10.521: ICD-10-CM

## 2024-04-29 PROBLEM — H25.013 CORTICAL CATARACT; BOTH EYES: Status: ACTIVE | Noted: 2024-04-02

## 2024-04-29 PROCEDURE — 92012 INTRM OPH EXAM EST PATIENT: CPT | Performed by: OPHTHALMOLOGY

## 2024-04-29 ASSESSMENT — LID POSITION - DERMATOCHALASIS
OD_DERMATOCHALASIS: RLL 3+
OS_DERMATOCHALASIS: LLL 3+

## 2024-08-06 ENCOUNTER — DOCTOR'S OFFICE (OUTPATIENT)
Dept: URBAN - METROPOLITAN AREA CLINIC 162 | Facility: CLINIC | Age: 74
Setting detail: OPHTHALMOLOGY
End: 2024-08-06
Payer: MEDICARE

## 2024-08-06 DIAGNOSIS — H40.1131: ICD-10-CM

## 2024-08-06 PROCEDURE — 92012 INTRM OPH EXAM EST PATIENT: CPT | Performed by: OPHTHALMOLOGY

## 2024-08-06 PROCEDURE — 92083 EXTENDED VISUAL FIELD XM: CPT | Performed by: OPHTHALMOLOGY

## 2024-08-06 PROCEDURE — 92145 CORNEAL HYSTERESIS DETER: CPT | Performed by: OPHTHALMOLOGY

## 2024-08-06 ASSESSMENT — LID POSITION - DERMATOCHALASIS
OS_DERMATOCHALASIS: LLL 3+
OD_DERMATOCHALASIS: RLL 3+

## 2024-08-16 ENCOUNTER — DOCTOR'S OFFICE (OUTPATIENT)
Dept: URBAN - METROPOLITAN AREA CLINIC 162 | Facility: CLINIC | Age: 74
Setting detail: OPHTHALMOLOGY
End: 2024-08-16
Payer: MEDICARE

## 2024-08-16 DIAGNOSIS — H02.35: ICD-10-CM

## 2024-08-16 DIAGNOSIS — H40.1131: ICD-10-CM

## 2024-08-16 DIAGNOSIS — H02.32: ICD-10-CM

## 2024-08-16 PROCEDURE — 92012 INTRM OPH EXAM EST PATIENT: CPT | Performed by: OPHTHALMOLOGY

## 2024-08-16 ASSESSMENT — LID POSITION - DERMATOCHALASIS
OD_DERMATOCHALASIS: RLL 3+
OS_DERMATOCHALASIS: LLL 3+

## 2024-08-22 ENCOUNTER — DOCTOR'S OFFICE (OUTPATIENT)
Dept: URBAN - METROPOLITAN AREA CLINIC 157 | Facility: CLINIC | Age: 74
Setting detail: OPHTHALMOLOGY
End: 2024-08-22
Payer: MEDICARE

## 2024-08-22 DIAGNOSIS — H02.835: ICD-10-CM

## 2024-08-22 DIAGNOSIS — H02.832: ICD-10-CM

## 2024-08-22 DIAGNOSIS — H40.1131: ICD-10-CM

## 2024-08-22 PROCEDURE — 92285 EXTERNAL OCULAR PHOTOGRAPHY: CPT | Performed by: OPHTHALMOLOGY

## 2024-08-22 PROCEDURE — 92012 INTRM OPH EXAM EST PATIENT: CPT | Performed by: OPHTHALMOLOGY

## 2024-08-22 ASSESSMENT — LID POSITION - DERMATOCHALASIS
OD_DERMATOCHALASIS: RLL 3+
OS_DERMATOCHALASIS: LLL 3+

## 2024-12-20 ENCOUNTER — DOCTOR'S OFFICE (OUTPATIENT)
Dept: URBAN - METROPOLITAN AREA CLINIC 162 | Facility: CLINIC | Age: 74
Setting detail: OPHTHALMOLOGY
End: 2024-12-20
Payer: MEDICARE

## 2024-12-20 ENCOUNTER — RX ONLY (RX ONLY)
Age: 74
End: 2024-12-20

## 2024-12-20 DIAGNOSIS — H40.1131: ICD-10-CM

## 2024-12-20 PROCEDURE — 92012 INTRM OPH EXAM EST PATIENT: CPT | Performed by: OPHTHALMOLOGY

## 2024-12-20 PROCEDURE — 92020 GONIOSCOPY: CPT | Performed by: OPHTHALMOLOGY

## 2024-12-20 PROCEDURE — 92133 CPTRZD OPH DX IMG PST SGM ON: CPT | Performed by: OPHTHALMOLOGY

## 2024-12-20 ASSESSMENT — REFRACTION_CURRENTRX
OS_CYLINDER: -1.00
OD_OVR_VA: 20/
OS_AXIS: 119
OD_AXIS: 017
OD_SPHERE: -2.50
OD_ADD: +2.50
OD_VPRISM_DIRECTION: PROGS
OD_CYLINDER: -0.50
OS_ADD: +2.50
OS_SPHERE: -0.75
OS_OVR_VA: 20/
OS_VPRISM_DIRECTION: PROGS

## 2024-12-20 ASSESSMENT — LID POSITION - DERMATOCHALASIS
OS_DERMATOCHALASIS: LLL 3+
OD_DERMATOCHALASIS: RLL 3+

## 2024-12-20 ASSESSMENT — REFRACTION_MANIFEST
OS_SPHERE: -0.75
OS_VA1: 20/40
OD_CYLINDER: SPH
OS_AXIS: 120
OD_VA1: 20/30
OD_ADD: +2.50
OD_SPHERE: -2.00
OS_CYLINDER: -1.00
OS_ADD: +2.50

## 2024-12-20 ASSESSMENT — TEAR BREAK UP TIME (TBUT)
OS_TBUT: 7 SEC
OD_TBUT: 5 SEC

## 2024-12-20 ASSESSMENT — KERATOMETRY
OS_K2POWER_DIOPTERS: 45.25
OS_AXISANGLE_DEGREES: 057
OD_AXISANGLE_DEGREES: 101
OD_K1POWER_DIOPTERS: 45.25
OD_K2POWER_DIOPTERS: 46.00
OS_K1POWER_DIOPTERS: 44.50

## 2024-12-20 ASSESSMENT — REFRACTION_AUTOREFRACTION
OS_AXIS: 121
OD_AXIS: 016
OD_SPHERE: -1.75
OS_CYLINDER: -1.00
OD_CYLINDER: -0.75
OS_SPHERE: -0.75

## 2024-12-20 ASSESSMENT — PUNCTAL DISCHARGE
OD_PUNCTAL_DISCHARGE: ABSENT
OS_PUNCTAL_DISCHARGE: ABSENT

## 2024-12-20 ASSESSMENT — VISUAL ACUITY
OD_BCVA: 20/40
OS_BCVA: 20/30

## 2025-04-22 ENCOUNTER — DOCTOR'S OFFICE (OUTPATIENT)
Dept: URBAN - METROPOLITAN AREA CLINIC 162 | Facility: CLINIC | Age: 75
Setting detail: OPHTHALMOLOGY
End: 2025-04-22
Payer: MEDICARE

## 2025-04-22 DIAGNOSIS — H35.363: ICD-10-CM

## 2025-04-22 DIAGNOSIS — H52.4: ICD-10-CM

## 2025-04-22 DIAGNOSIS — H25.013: ICD-10-CM

## 2025-04-22 DIAGNOSIS — H04.123: ICD-10-CM

## 2025-04-22 DIAGNOSIS — H40.1131: ICD-10-CM

## 2025-04-22 DIAGNOSIS — H25.13: ICD-10-CM

## 2025-04-22 PROCEDURE — 92014 COMPRE OPH EXAM EST PT 1/>: CPT | Performed by: OPHTHALMOLOGY

## 2025-04-22 PROCEDURE — 92015 DETERMINE REFRACTIVE STATE: CPT | Performed by: OPHTHALMOLOGY

## 2025-04-22 PROCEDURE — 92250 FUNDUS PHOTOGRAPHY W/I&R: CPT | Performed by: OPHTHALMOLOGY

## 2025-04-22 PROCEDURE — 92145 CORNEAL HYSTERESIS DETER: CPT | Performed by: OPHTHALMOLOGY

## 2025-04-22 ASSESSMENT — TEAR BREAK UP TIME (TBUT)
OD_TBUT: 5 SEC
OS_TBUT: 7 SEC

## 2025-04-22 ASSESSMENT — REFRACTION_AUTOREFRACTION
OD_CYLINDER: -0.25
OS_AXIS: 109
OS_SPHERE: -1.00
OD_SPHERE: -2.00
OS_CYLINDER: -1.00
OD_AXIS: 062

## 2025-04-22 ASSESSMENT — CONFRONTATIONAL VISUAL FIELD TEST (CVF)
OS_FINDINGS: FULL
OD_FINDINGS: FULL

## 2025-04-22 ASSESSMENT — REFRACTION_CURRENTRX
OS_ADD: +2.50
OS_VPRISM_DIRECTION: PROGS
OS_AXIS: 120
OD_OVR_VA: 20/
OS_OVR_VA: 20/
OD_VPRISM_DIRECTION: PROGS
OD_AXIS: 020
OD_CYLINDER: -0.50
OD_ADD: +2.50
OS_CYLINDER: -1.00
OS_SPHERE: -0.75
OD_SPHERE: -2.50

## 2025-04-22 ASSESSMENT — REFRACTION_MANIFEST
OS_ADD: +2.50
OS_VA1: 20/30-
OD_CYLINDER: -0.50
OD_AXIS: 035
OD_VA1: 20/30+
OS_AXIS: 115
OU_VA: 20/30+
OS_SPHERE: -0.50
OD_ADD: +2.50
OS_CYLINDER: -1.00
OD_SPHERE: -2.00

## 2025-04-22 ASSESSMENT — KERATOMETRY
OD_AXISANGLE_DEGREES: 122
OS_AXISANGLE_DEGREES: 047
OS_K1POWER_DIOPTERS: 44.50
OS_K2POWER_DIOPTERS: 45.25
OD_K2POWER_DIOPTERS: 45.75
OD_K1POWER_DIOPTERS: 45.25

## 2025-04-22 ASSESSMENT — LID POSITION - DERMATOCHALASIS
OD_DERMATOCHALASIS: RLL 3+
OS_DERMATOCHALASIS: LLL 3+

## 2025-04-22 ASSESSMENT — PACHYMETRY
OS_CT_UM: 506
OD_CT_CORRECTION: 4
OD_CT_UM: 496
OS_CT_CORRECTION: 3

## 2025-04-22 ASSESSMENT — TONOMETRY: OD_IOP_MMHG: 11

## 2025-04-22 ASSESSMENT — VISUAL ACUITY
OD_BCVA: 20/60+
OS_BCVA: 20/30-

## 2025-04-22 ASSESSMENT — PUNCTAL DISCHARGE
OS_PUNCTAL_DISCHARGE: ABSENT
OD_PUNCTAL_DISCHARGE: ABSENT

## 2025-08-12 ENCOUNTER — DOCTOR'S OFFICE (OUTPATIENT)
Dept: URBAN - METROPOLITAN AREA CLINIC 162 | Facility: CLINIC | Age: 75
Setting detail: OPHTHALMOLOGY
End: 2025-08-12
Payer: MEDICARE

## 2025-08-12 ENCOUNTER — RX ONLY (RX ONLY)
Age: 75
End: 2025-08-12

## 2025-08-12 DIAGNOSIS — H40.1131: ICD-10-CM

## 2025-08-12 DIAGNOSIS — H25.013: ICD-10-CM

## 2025-08-12 DIAGNOSIS — H25.13: ICD-10-CM

## 2025-08-12 DIAGNOSIS — H35.363: ICD-10-CM

## 2025-08-12 DIAGNOSIS — H04.123: ICD-10-CM

## 2025-08-12 PROCEDURE — 92083 EXTENDED VISUAL FIELD XM: CPT | Performed by: OPHTHALMOLOGY

## 2025-08-12 PROCEDURE — 92134 CPTRZ OPH DX IMG PST SGM RTA: CPT | Performed by: OPHTHALMOLOGY

## 2025-08-12 PROCEDURE — 99213 OFFICE O/P EST LOW 20 MIN: CPT | Performed by: OPHTHALMOLOGY

## 2025-08-12 ASSESSMENT — REFRACTION_MANIFEST
OS_SPHERE: -0.50
OS_AXIS: 115
OD_AXIS: 035
OU_VA: 20/30+
OD_SPHERE: -2.00
OS_ADD: +2.50
OD_ADD: +2.50
OS_CYLINDER: -1.00
OD_VA1: 20/30+
OD_CYLINDER: -0.50
OS_VA1: 20/30-

## 2025-08-12 ASSESSMENT — KERATOMETRY
OS_AXISANGLE_DEGREES: 047
OS_K2POWER_DIOPTERS: 45.25
OS_K1POWER_DIOPTERS: 44.50
OD_K2POWER_DIOPTERS: 45.75
OD_AXISANGLE_DEGREES: 122
OD_K1POWER_DIOPTERS: 45.25

## 2025-08-12 ASSESSMENT — VISUAL ACUITY
OD_BCVA: 20/60+
OS_BCVA: 20/30-

## 2025-08-12 ASSESSMENT — TEAR BREAK UP TIME (TBUT)
OS_TBUT: 7 SEC
OD_TBUT: 5 SEC

## 2025-08-12 ASSESSMENT — REFRACTION_CURRENTRX
OS_OVR_VA: 20/
OD_OVR_VA: 20/
OD_CYLINDER: -0.50
OS_VPRISM_DIRECTION: PROGS
OD_ADD: +2.50
OD_SPHERE: -2.50
OD_VPRISM_DIRECTION: PROGS
OD_AXIS: 020
OS_CYLINDER: -1.00
OS_AXIS: 120
OS_ADD: +2.50
OS_SPHERE: -0.75

## 2025-08-12 ASSESSMENT — LID POSITION - DERMATOCHALASIS
OS_DERMATOCHALASIS: LLL 3+
OD_DERMATOCHALASIS: RLL 3+

## 2025-08-12 ASSESSMENT — REFRACTION_AUTOREFRACTION
OD_SPHERE: -2.00
OS_SPHERE: -1.00
OD_AXIS: 062
OD_CYLINDER: -0.25
OS_AXIS: 109
OS_CYLINDER: -1.00

## 2025-08-12 ASSESSMENT — PUNCTAL DISCHARGE
OS_PUNCTAL_DISCHARGE: ABSENT
OD_PUNCTAL_DISCHARGE: ABSENT